# Patient Record
Sex: FEMALE | Race: BLACK OR AFRICAN AMERICAN | URBAN - METROPOLITAN AREA
[De-identification: names, ages, dates, MRNs, and addresses within clinical notes are randomized per-mention and may not be internally consistent; named-entity substitution may affect disease eponyms.]

---

## 2023-06-17 ENCOUNTER — HOSPITAL ENCOUNTER (OUTPATIENT)
Age: 48
Discharge: HOME | End: 2023-06-17
Payer: COMMERCIAL

## 2023-06-17 VITALS
HEIGHT: 65 IN | TEMPERATURE: 98 F | OXYGEN SATURATION: 98 % | SYSTOLIC BLOOD PRESSURE: 127 MMHG | WEIGHT: 153 LBS | DIASTOLIC BLOOD PRESSURE: 90 MMHG | HEART RATE: 116 BPM | BODY MASS INDEX: 25.49 KG/M2 | RESPIRATION RATE: 18 BRPM

## 2023-06-17 DIAGNOSIS — U07.1 COVID: Primary | ICD-10-CM

## 2023-06-17 LAB — SARS-COV-2 RNA RESP QL NAA+PROBE: DETECTED

## 2023-06-17 PROCEDURE — 99203 OFFICE O/P NEW LOW 30 MIN: CPT

## 2023-06-17 NOTE — DISCHARGE INSTRUCTIONS
Stay away from people for 5 full days starting from your positive test result. Strict indoor masking for the 5 days following quarantine. Over-the-counter cough and cold medication as needed for symptom management. Monitor your oxygen levels with a pulse oximeter. If your oxygen levels are less than 95% persistently go to the ER. If you develop fever that lasts longer than 5 days, chest pain or shortness of breath go to the ER. Follow up with your primary doctor. If you have new, changing or worsening symptoms, please go directly to the ER.

## 2024-02-24 ENCOUNTER — HOSPITAL ENCOUNTER (OUTPATIENT)
Age: 49
Discharge: HOME | End: 2024-02-24
Payer: COMMERCIAL

## 2024-02-24 ENCOUNTER — APPOINTMENT (OUTPATIENT)
Dept: CT IMAGING | Age: 49
End: 2024-02-24
Attending: NURSE PRACTITIONER
Payer: COMMERCIAL

## 2024-02-24 VITALS
TEMPERATURE: 97 F | HEIGHT: 65 IN | BODY MASS INDEX: 24.99 KG/M2 | SYSTOLIC BLOOD PRESSURE: 147 MMHG | DIASTOLIC BLOOD PRESSURE: 91 MMHG | RESPIRATION RATE: 16 BRPM | HEART RATE: 77 BPM | OXYGEN SATURATION: 98 % | WEIGHT: 150 LBS

## 2024-02-24 DIAGNOSIS — R51.9 NONINTRACTABLE HEADACHE, UNSPECIFIED CHRONICITY PATTERN, UNSPECIFIED HEADACHE TYPE: Primary | ICD-10-CM

## 2024-02-24 PROCEDURE — 99214 OFFICE O/P EST MOD 30 MIN: CPT

## 2024-02-24 PROCEDURE — 96374 THER/PROPH/DIAG INJ IV PUSH: CPT

## 2024-02-24 PROCEDURE — 70450 CT HEAD/BRAIN W/O DYE: CPT | Performed by: NURSE PRACTITIONER

## 2024-02-24 PROCEDURE — 96361 HYDRATE IV INFUSION ADD-ON: CPT

## 2024-02-24 PROCEDURE — 96375 TX/PRO/DX INJ NEW DRUG ADDON: CPT

## 2024-02-24 RX ORDER — KETOROLAC TROMETHAMINE 30 MG/ML
30 INJECTION, SOLUTION INTRAMUSCULAR; INTRAVENOUS ONCE
Status: COMPLETED | OUTPATIENT
Start: 2024-02-24 | End: 2024-02-24

## 2024-02-24 RX ORDER — SODIUM CHLORIDE 9 MG/ML
1000 INJECTION, SOLUTION INTRAVENOUS ONCE
Status: COMPLETED | OUTPATIENT
Start: 2024-02-24 | End: 2024-02-24

## 2024-02-24 RX ORDER — DIPHENHYDRAMINE HYDROCHLORIDE 50 MG/ML
25 INJECTION INTRAMUSCULAR; INTRAVENOUS ONCE
Status: DISCONTINUED | OUTPATIENT
Start: 2024-02-24 | End: 2024-02-24

## 2024-02-24 RX ORDER — METOCLOPRAMIDE HYDROCHLORIDE 5 MG/ML
10 INJECTION INTRAMUSCULAR; INTRAVENOUS ONCE
Status: COMPLETED | OUTPATIENT
Start: 2024-02-24 | End: 2024-02-24

## 2024-02-24 RX ORDER — BUTALBITAL, ACETAMINOPHEN AND CAFFEINE 50; 325; 40 MG/1; MG/1; MG/1
1-2 TABLET ORAL EVERY 6 HOURS PRN
Qty: 10 TABLET | Refills: 0 | Status: SHIPPED | OUTPATIENT
Start: 2024-02-24 | End: 2024-02-29

## 2024-02-24 NOTE — DISCHARGE INSTRUCTIONS
-Acetaminophen 650 mg orally every 4 hours as needed for pain.  Do not exceed 4000 mg in 24 hours.     -Ibuprofen 600 mg orally every 6 hours as needed for pain.  Take with food.  Discontinue use and seek medical attention with blood in vomit or stool, coffee-ground vomit, or dark black stool.     -Fioricet as prescribed.     -Rest in a quiet dark room with minimal stimulation.     -Follow-up with the primary care provider within 3-5 days reassessment.     -Please monitor for and seek medical attention with severe worsening headache, vision changes, numbness, tingling, loss sensation, weakness, difficulties ambulate, difficulty speaking, fevers, neck pain/stiffness, or any other concerns.

## 2024-02-24 NOTE — ED QUICK NOTES
Reviewed discharge information with patient. Patient verbalized understanding, no further questions or complaints at this time. Patient is alert and orientated x4, in no apparent distress, ambulating with steady gait. IV discontinued. Instructed patient to go to ED for any new or worsening symptoms. Patient instructed to not drive for 8 hours after receiving taking medication. Patient verbalized understanding.

## 2024-02-24 NOTE — ED PROVIDER NOTES
Patient Seen in: [facility]    History     Chief Complaint   Patient presents with    Headache     Stated Complaint: headache, nausea    HPI  Pt c/o a 6-7/10 headache that began yesterday..  Headache is similar to her previous episodes of headaches.  She has had nausea with no vomiting.  She denies any changes in vision.  patient denies fever, stiff neck, visual changes or any focal neuro deficit.      History reviewed. No pertinent past medical history.    Past Surgical History:   Procedure Laterality Date    CARPAL TUNNEL RELEASE      OTHER ACCESSORY      bone spur removal            No family history on file.    Social History     Socioeconomic History    Marital status: Single   Tobacco Use    Smoking status: Never    Smokeless tobacco: Never       Review of Systems    Positive for stated complaint: headache, nausea  Other systems are as noted in HPI.  Constitutional and vital signs reviewed.      All other systems reviewed and negative except as noted above.    PSFH elements reviewed from today and agreed except as otherwise stated in HPI.    Physical Exam     ED Triage Vitals [02/24/24 1412]   BP (!) 152/99   Pulse 78   Resp 18   Temp 97.4 °F (36.3 °C)   Temp src Temporal   SpO2 97 %   O2 Device None (Room air)       Current:BP (!) 147/91   Pulse 77   Temp 97.4 °F (36.3 °C) (Temporal)   Resp 16   Ht 165.1 cm (5' 5\")   Wt 68 kg   LMP 10/18/2023 (Within Months)   SpO2 98%   BMI 24.96 kg/m²   PULSE OX 97% on  RA   Constitutional:  No acute distress  HEENT:  Head normocephalic and atraumatic. No scleral icterus or erythema. Pharynx moist without erythema or exudate.  CV:  Regular rate and rhythm. No murmur. Peripheral pulses intact.  Respiratory:  Lungs clear to auscultation bilaterally  Abdomen:  Soft, non-tender, non-distended.  Back:  No CVA or vertebral tenderness  Skin:  Normal color. Warm and Dry  Extremities:  Non-tender. No pedal edema.   Neuro:  Oriented x3.  PERRL, EOMI. CN II -  XII grossly intact.  No gross motor deficits.  5/5 strength in all distribution.  Sensation fully intact.      DDX to include tension headache vs. Migraine headache vs. Sinusitis vs. SAH        ED Course   Labs Reviewed - No data to display  I have personally  reviewed available prior medical records for any recent pertinent discharge summaries/testing. Patient/family updated on results and plan, a verbalized understanding and agreement with the plan.  I explained to the patient that emergent conditions may arise and to go to the ER for new, worsening or any persistent conditions. I've explained the importance of taking all medicatons as prescribed, follow up, and return precuations,  All questions answered.    Please note that this report has been produced using speech recognition software and may contain errors related to that system including, but not limited to, errors in grammar, punctuation, and spelling, as well as words and phrases that possibly may have been recognized inappropriately.  If there are any questions or concerns, contact the dictating provider for clarification.  MDM     Sources of the medical history included the patient     Differential diagnosis before testing included URI, influenza, headache, mass, ich  a medical condition that poses a threat to life      I have discussed the patient's results of testing, differential diagnosis and treatment plan, they expressed clear understanding of these instructions and agreed to the plan provided    The 21st Century Cures Act makes medical notes like these available to patients in the interest of transparency. Please be advised this is a medical document. Medical documents are intended to carry relevant information, facts as evident, and the clinical opinion of the practitioner. The medical note is intended as peer to peer communication and may appear blunt or direct. It is written in medical language and may contain abbreviations or verbiage that are  unfamiliar.    Patient presenting with headache.  Patient  with no abnormal symptoms for patient. Never had a ct scan,  iv fluid, reglan, toradol ordered.  Ct scan ordered and shows no mass or ICH.    CT BRAIN OR HEAD (18762)    Result Date: 2/24/2024  CONCLUSION:  No CT evidence for acute intracranial process.  LOCATION:  [facility]   Dictated by (CST): Yamilet Hurtado MD on 2/24/2024 at 3:37 PM     Finalized by (CST): Yamilet Hurtado MD on 2/24/2024 at 3:39 PM         Patient required frequent reassessment of condition.  Pt is feeling much relief of symptoms,.  Patient was discharged home. Patient advised to return to ER if alteration in mental status, onset of fevers, visual changes, or peripheral weakness/numbness/tingling.   Disposition and Plan     Clinical Impression:  1. Nonintractable headache, unspecified chronicity pattern, unspecified headache type        Disposition:  Discharge    Follow-up:  No follow-up provider specified.    Medications Prescribed:  Discharge Medication List as of 2/24/2024  3:45 PM        START taking these medications    Details   butalbital-acetaminophen-caffeine -40 MG Oral Tab Take 1-2 tablets by mouth every 6 (six) hours as needed for Pain., Normal, Disp-10 tablet, R-0